# Patient Record
Sex: FEMALE | Race: WHITE | NOT HISPANIC OR LATINO | URBAN - METROPOLITAN AREA
[De-identification: names, ages, dates, MRNs, and addresses within clinical notes are randomized per-mention and may not be internally consistent; named-entity substitution may affect disease eponyms.]

---

## 2019-07-01 ENCOUNTER — INPATIENT (INPATIENT)
Facility: HOSPITAL | Age: 34
LOS: 1 days | Discharge: HOME | End: 2019-07-03
Attending: OBSTETRICS & GYNECOLOGY | Admitting: OBSTETRICS & GYNECOLOGY
Payer: COMMERCIAL

## 2019-07-01 VITALS
TEMPERATURE: 99 F | DIASTOLIC BLOOD PRESSURE: 51 MMHG | RESPIRATION RATE: 18 BRPM | SYSTOLIC BLOOD PRESSURE: 97 MMHG | HEART RATE: 82 BPM

## 2019-07-01 DIAGNOSIS — O26.899 OTHER SPECIFIED PREGNANCY RELATED CONDITIONS, UNSPECIFIED TRIMESTER: ICD-10-CM

## 2019-07-01 DIAGNOSIS — Z3A.00 WEEKS OF GESTATION OF PREGNANCY NOT SPECIFIED: ICD-10-CM

## 2019-07-01 DIAGNOSIS — Z34.80 ENCOUNTER FOR SUPERVISION OF OTHER NORMAL PREGNANCY, UNSPECIFIED TRIMESTER: ICD-10-CM

## 2019-07-01 LAB
BASOPHILS # BLD AUTO: 0.1 K/UL — SIGNIFICANT CHANGE UP (ref 0–0.2)
BASOPHILS NFR BLD AUTO: 0.5 % — SIGNIFICANT CHANGE UP (ref 0–2)
BLD GP AB SCN SERPL QL: NEGATIVE — SIGNIFICANT CHANGE UP
EOSINOPHIL # BLD AUTO: 0.1 K/UL — SIGNIFICANT CHANGE UP (ref 0–0.5)
EOSINOPHIL NFR BLD AUTO: 0.7 % — SIGNIFICANT CHANGE UP (ref 0–6)
HCT VFR BLD CALC: 40.5 % — SIGNIFICANT CHANGE UP (ref 34.5–45)
HGB BLD-MCNC: 13.4 G/DL — SIGNIFICANT CHANGE UP (ref 11.5–15.5)
LYMPHOCYTES # BLD AUTO: 1.5 K/UL — SIGNIFICANT CHANGE UP (ref 1–3.3)
LYMPHOCYTES # BLD AUTO: 15.2 % — SIGNIFICANT CHANGE UP (ref 13–44)
MCHC RBC-ENTMCNC: 26.7 PG — LOW (ref 27–34)
MCHC RBC-ENTMCNC: 33 GM/DL — SIGNIFICANT CHANGE UP (ref 32–36)
MCV RBC AUTO: 80.9 FL — SIGNIFICANT CHANGE UP (ref 80–100)
MONOCYTES # BLD AUTO: 1 K/UL — HIGH (ref 0–0.9)
MONOCYTES NFR BLD AUTO: 10.5 % — SIGNIFICANT CHANGE UP (ref 2–14)
NEUTROPHILS # BLD AUTO: 7.1 K/UL — SIGNIFICANT CHANGE UP (ref 1.8–7.4)
NEUTROPHILS NFR BLD AUTO: 73 % — SIGNIFICANT CHANGE UP (ref 43–77)
PLATELET # BLD AUTO: 191 K/UL — SIGNIFICANT CHANGE UP (ref 150–400)
RBC # BLD: 5 M/UL — SIGNIFICANT CHANGE UP (ref 3.8–5.2)
RBC # FLD: 14.1 % — SIGNIFICANT CHANGE UP (ref 10.3–14.5)
RH IG SCN BLD-IMP: NEGATIVE — SIGNIFICANT CHANGE UP
RH IG SCN BLD-IMP: NEGATIVE — SIGNIFICANT CHANGE UP
T PALLIDUM AB TITR SER: NEGATIVE — SIGNIFICANT CHANGE UP
WBC # BLD: 9.8 K/UL — SIGNIFICANT CHANGE UP (ref 3.8–10.5)
WBC # FLD AUTO: 9.8 K/UL — SIGNIFICANT CHANGE UP (ref 3.8–10.5)

## 2019-07-01 RX ORDER — BENZOCAINE 10 %
1 GEL (GRAM) MUCOUS MEMBRANE EVERY 6 HOURS
Refills: 0 | Status: DISCONTINUED | OUTPATIENT
Start: 2019-07-01 | End: 2019-07-03

## 2019-07-01 RX ORDER — KETOROLAC TROMETHAMINE 30 MG/ML
30 SYRINGE (ML) INJECTION ONCE
Refills: 0 | Status: DISCONTINUED | OUTPATIENT
Start: 2019-07-01 | End: 2019-07-01

## 2019-07-01 RX ORDER — DOCUSATE SODIUM 100 MG
100 CAPSULE ORAL
Refills: 0 | Status: DISCONTINUED | OUTPATIENT
Start: 2019-07-01 | End: 2019-07-03

## 2019-07-01 RX ORDER — LANOLIN
1 OINTMENT (GRAM) TOPICAL EVERY 6 HOURS
Refills: 0 | Status: DISCONTINUED | OUTPATIENT
Start: 2019-07-01 | End: 2019-07-03

## 2019-07-01 RX ORDER — AER TRAVELER 0.5 G/1
1 SOLUTION RECTAL; TOPICAL EVERY 4 HOURS
Refills: 0 | Status: DISCONTINUED | OUTPATIENT
Start: 2019-07-01 | End: 2019-07-03

## 2019-07-01 RX ORDER — SODIUM CHLORIDE 9 MG/ML
1000 INJECTION, SOLUTION INTRAVENOUS
Refills: 0 | Status: DISCONTINUED | OUTPATIENT
Start: 2019-07-01 | End: 2019-07-01

## 2019-07-01 RX ORDER — CITRIC ACID/SODIUM CITRATE 300-500 MG
15 SOLUTION, ORAL ORAL EVERY 6 HOURS
Refills: 0 | Status: DISCONTINUED | OUTPATIENT
Start: 2019-07-01 | End: 2019-07-01

## 2019-07-01 RX ORDER — SIMETHICONE 80 MG/1
80 TABLET, CHEWABLE ORAL EVERY 4 HOURS
Refills: 0 | Status: DISCONTINUED | OUTPATIENT
Start: 2019-07-01 | End: 2019-07-03

## 2019-07-01 RX ORDER — IBUPROFEN 200 MG
600 TABLET ORAL EVERY 6 HOURS
Refills: 0 | Status: COMPLETED | OUTPATIENT
Start: 2019-07-01 | End: 2020-05-29

## 2019-07-01 RX ORDER — HYDROCORTISONE 1 %
1 OINTMENT (GRAM) TOPICAL EVERY 6 HOURS
Refills: 0 | Status: DISCONTINUED | OUTPATIENT
Start: 2019-07-01 | End: 2019-07-03

## 2019-07-01 RX ORDER — DIBUCAINE 1 %
1 OINTMENT (GRAM) RECTAL EVERY 6 HOURS
Refills: 0 | Status: DISCONTINUED | OUTPATIENT
Start: 2019-07-01 | End: 2019-07-03

## 2019-07-01 RX ORDER — PRAMOXINE HYDROCHLORIDE 150 MG/15G
1 AEROSOL, FOAM RECTAL EVERY 4 HOURS
Refills: 0 | Status: DISCONTINUED | OUTPATIENT
Start: 2019-07-01 | End: 2019-07-03

## 2019-07-01 RX ORDER — OXYTOCIN 10 UNIT/ML
333.33 VIAL (ML) INJECTION
Qty: 20 | Refills: 0 | Status: DISCONTINUED | OUTPATIENT
Start: 2019-07-01 | End: 2019-07-03

## 2019-07-01 RX ORDER — OXYTOCIN 10 UNIT/ML
333.33 VIAL (ML) INJECTION
Qty: 20 | Refills: 0 | Status: DISCONTINUED | OUTPATIENT
Start: 2019-07-01 | End: 2019-07-01

## 2019-07-01 RX ORDER — SODIUM CHLORIDE 9 MG/ML
3 INJECTION INTRAMUSCULAR; INTRAVENOUS; SUBCUTANEOUS EVERY 8 HOURS
Refills: 0 | Status: DISCONTINUED | OUTPATIENT
Start: 2019-07-01 | End: 2019-07-03

## 2019-07-01 RX ORDER — IBUPROFEN 200 MG
600 TABLET ORAL EVERY 6 HOURS
Refills: 0 | Status: DISCONTINUED | OUTPATIENT
Start: 2019-07-01 | End: 2019-07-03

## 2019-07-01 RX ORDER — DIPHENHYDRAMINE HCL 50 MG
25 CAPSULE ORAL EVERY 6 HOURS
Refills: 0 | Status: DISCONTINUED | OUTPATIENT
Start: 2019-07-01 | End: 2019-07-03

## 2019-07-01 RX ORDER — MAGNESIUM HYDROXIDE 400 MG/1
30 TABLET, CHEWABLE ORAL
Refills: 0 | Status: DISCONTINUED | OUTPATIENT
Start: 2019-07-01 | End: 2019-07-03

## 2019-07-01 RX ORDER — OXYCODONE HYDROCHLORIDE 5 MG/1
5 TABLET ORAL ONCE
Refills: 0 | Status: DISCONTINUED | OUTPATIENT
Start: 2019-07-01 | End: 2019-07-03

## 2019-07-01 RX ORDER — TETANUS TOXOID, REDUCED DIPHTHERIA TOXOID AND ACELLULAR PERTUSSIS VACCINE, ADSORBED 5; 2.5; 8; 8; 2.5 [IU]/.5ML; [IU]/.5ML; UG/.5ML; UG/.5ML; UG/.5ML
0.5 SUSPENSION INTRAMUSCULAR ONCE
Refills: 0 | Status: COMPLETED | OUTPATIENT
Start: 2019-07-01

## 2019-07-01 RX ORDER — ACETAMINOPHEN 500 MG
975 TABLET ORAL
Refills: 0 | Status: DISCONTINUED | OUTPATIENT
Start: 2019-07-01 | End: 2019-07-03

## 2019-07-01 RX ORDER — GLYCERIN ADULT
1 SUPPOSITORY, RECTAL RECTAL AT BEDTIME
Refills: 0 | Status: DISCONTINUED | OUTPATIENT
Start: 2019-07-01 | End: 2019-07-03

## 2019-07-01 RX ORDER — OXYCODONE HYDROCHLORIDE 5 MG/1
5 TABLET ORAL
Refills: 0 | Status: DISCONTINUED | OUTPATIENT
Start: 2019-07-01 | End: 2019-07-03

## 2019-07-01 RX ADMIN — Medication 600 MILLIGRAM(S): at 15:45

## 2019-07-01 RX ADMIN — Medication 600 MILLIGRAM(S): at 21:45

## 2019-07-01 RX ADMIN — Medication 30 MILLIGRAM(S): at 07:55

## 2019-07-01 RX ADMIN — Medication 1 TABLET(S): at 13:02

## 2019-07-01 RX ADMIN — Medication 600 MILLIGRAM(S): at 21:04

## 2019-07-01 RX ADMIN — Medication 600 MILLIGRAM(S): at 16:32

## 2019-07-01 NOTE — DISCHARGE NOTE OB - CARE PROVIDER_API CALL
Chyna Ackerman)  Obstetrics  Gynecology  [address] [place] [address]  Phone: [phone]  Fax: [phone]  Follow Up Time:

## 2019-07-01 NOTE — DISCHARGE NOTE OB - HOSPITAL COURSE
pt presented to labor and delivery in precipitous labor, delivered a viable liveborn infant successfully vaginally, pt did well; postpartum course was uneventful  and pt was discharged in stable condition

## 2019-07-01 NOTE — DISCHARGE NOTE OB - CARE PLAN
Principal Discharge DX:	Precipitate labor, delivered, current hospitalization  Goal:	full recovery  Assessment and plan of treatment:	nothing in vagia for 6 weeks may shower no heavy lifting, regular diet  Secondary Diagnosis:	Vaginal delivery

## 2019-07-01 NOTE — DISCHARGE NOTE OB - REASON FOR ADMISSION
precipitous labor Nasal mucosa clear. TM's within normal limits, no rhinorrhea, no otorrhea, no raccoon eyes, no treviño sign Mouth with normal mucosa  Throat has no vesicles, no oropharyngeal exudates and uvula is midline.

## 2019-07-01 NOTE — DISCHARGE NOTE OB - PATIENT PORTAL LINK FT
You can access the [facility] Patient Portal, offered by [facility], by registering with the following website: [email or web address]

## 2019-07-02 LAB
HCT VFR BLD CALC: 37.8 % — SIGNIFICANT CHANGE UP (ref 34.5–45)
HGB BLD-MCNC: 12.7 G/DL — SIGNIFICANT CHANGE UP (ref 11.5–15.5)
KLEIHAUER-BETKE CALCULATION: 0 % — SIGNIFICANT CHANGE UP (ref 0–0.3)

## 2019-07-02 RX ORDER — TETANUS TOXOID, REDUCED DIPHTHERIA TOXOID AND ACELLULAR PERTUSSIS VACCINE, ADSORBED 5; 2.5; 8; 8; 2.5 [IU]/.5ML; [IU]/.5ML; UG/.5ML; UG/.5ML; UG/.5ML
0.5 SUSPENSION INTRAMUSCULAR ONCE
Refills: 0 | Status: COMPLETED | OUTPATIENT
Start: 2019-07-02 | End: 2019-07-02

## 2019-07-02 RX ADMIN — Medication 600 MILLIGRAM(S): at 06:15

## 2019-07-02 RX ADMIN — Medication 600 MILLIGRAM(S): at 12:00

## 2019-07-02 RX ADMIN — Medication 600 MILLIGRAM(S): at 11:32

## 2019-07-02 RX ADMIN — Medication 600 MILLIGRAM(S): at 18:00

## 2019-07-02 RX ADMIN — Medication 1 TABLET(S): at 11:32

## 2019-07-02 RX ADMIN — Medication 600 MILLIGRAM(S): at 07:00

## 2019-07-02 RX ADMIN — Medication 600 MILLIGRAM(S): at 17:18

## 2019-07-02 RX ADMIN — TETANUS TOXOID, REDUCED DIPHTHERIA TOXOID AND ACELLULAR PERTUSSIS VACCINE, ADSORBED 0.5 MILLILITER(S): 5; 2.5; 8; 8; 2.5 SUSPENSION INTRAMUSCULAR at 21:09

## 2019-07-02 NOTE — PROGRESS NOTE ADULT - SUBJECTIVE AND OBJECTIVE BOX
OB Progress Note:  PPD#1    S: 33yo  PPD#1 s/p . Patient feels well. Pain is well controlled. She is tolerating a regular diet and passing flatus. She is voiding spontaneously, and ambulating without difficulty. Denies CP/SOB. Denies lightheadedness/dizziness. Denies N/V.    O:  Vitals:  Vital Signs Last 24 Hrs  T(C): 36.7 (2019 22:10), Max: 37.1 (2019 07:00)  T(F): 98.1 (2019 22:10), Max: 98.8 (2019 07:00)  HR: 76 (2019 22:10) (66 - 85)  BP: 111/75 (2019 22:10) (94/62 - 116/56)  BP(mean): 80 (2019 09:20) (71 - 84)  RR: 18 (2019 22:10) (16 - 18)  SpO2: 98% (2019 17:11) (98% - 100%)    MEDICATIONS  (STANDING):  acetaminophen   Tablet .. 975 milliGRAM(s) Oral <User Schedule>  diphtheria/tetanus/pertussis (acellular) Vaccine (ADAcel) 0.5 milliLiter(s) IntraMuscular once  ibuprofen  Tablet. 600 milliGRAM(s) Oral every 6 hours  oxytocin Infusion 333.333 milliUNIT(s)/Min (1000 mL/Hr) IV Continuous <Continuous>  prenatal multivitamin 1 Tablet(s) Oral daily  sodium chloride 0.9% lock flush 3 milliLiter(s) IV Push every 8 hours      Labs:  Blood type: B Negative  Rubella IgG: RPR: Negative                          13.4   9.8 >-----------< 191    (  @ 08:16 )             40.5        Physical Exam:  General: NAD  Abdomen: soft, non-tender, non-distended, fundus firm  Vaginal: Lochia wnl  Extremities: No erythema/edema      Christiano Dobbins PGY1

## 2019-07-03 VITALS
TEMPERATURE: 98 F | SYSTOLIC BLOOD PRESSURE: 108 MMHG | RESPIRATION RATE: 18 BRPM | DIASTOLIC BLOOD PRESSURE: 71 MMHG | OXYGEN SATURATION: 97 % | HEART RATE: 84 BPM

## 2019-07-03 PROCEDURE — G0463: CPT

## 2019-07-03 PROCEDURE — 90715 TDAP VACCINE 7 YRS/> IM: CPT

## 2019-07-03 PROCEDURE — 85014 HEMATOCRIT: CPT

## 2019-07-03 PROCEDURE — 85018 HEMOGLOBIN: CPT

## 2019-07-03 PROCEDURE — 59025 FETAL NON-STRESS TEST: CPT

## 2019-07-03 PROCEDURE — 86900 BLOOD TYPING SEROLOGIC ABO: CPT

## 2019-07-03 PROCEDURE — 86780 TREPONEMA PALLIDUM: CPT

## 2019-07-03 PROCEDURE — 85460 HEMOGLOBIN FETAL: CPT

## 2019-07-03 PROCEDURE — 85027 COMPLETE CBC AUTOMATED: CPT

## 2019-07-03 PROCEDURE — 59050 FETAL MONITOR W/REPORT: CPT

## 2019-07-03 PROCEDURE — 86901 BLOOD TYPING SEROLOGIC RH(D): CPT

## 2019-07-03 PROCEDURE — 86850 RBC ANTIBODY SCREEN: CPT

## 2019-07-03 RX ADMIN — Medication 600 MILLIGRAM(S): at 13:00

## 2019-07-03 RX ADMIN — Medication 600 MILLIGRAM(S): at 07:45

## 2019-07-03 RX ADMIN — Medication 600 MILLIGRAM(S): at 12:22

## 2019-07-03 RX ADMIN — Medication 600 MILLIGRAM(S): at 06:53

## 2019-07-03 RX ADMIN — Medication 1 TABLET(S): at 12:22

## 2020-01-30 NOTE — DISCHARGE NOTE OB - INCREASED VAGINAL BLEEDING OR LARGE CLOTS (SATURATING A PAD AN HOUR)
Quality 130: Documentation Of Current Medications In The Medical Record: Current Medications Documented
Additional Notes: Pt does not get any shots
Quality 111:Pneumonia Vaccination Status For Older Adults: Pneumococcal Vaccination not Administered or Previously Received, Reason not Otherwise Specified
Detail Level: Detailed
Statement Selected